# Patient Record
Sex: FEMALE | Race: WHITE | URBAN - METROPOLITAN AREA
[De-identification: names, ages, dates, MRNs, and addresses within clinical notes are randomized per-mention and may not be internally consistent; named-entity substitution may affect disease eponyms.]

---

## 2022-04-30 ENCOUNTER — TELEPHONE ENCOUNTER (OUTPATIENT)
Dept: URBAN - METROPOLITAN AREA CLINIC 121 | Facility: CLINIC | Age: 62
End: 2022-04-30

## 2022-05-01 ENCOUNTER — TELEPHONE ENCOUNTER (OUTPATIENT)
Dept: URBAN - METROPOLITAN AREA CLINIC 121 | Facility: CLINIC | Age: 62
End: 2022-05-01

## 2022-05-01 RX ORDER — SERTRALINE HCL 25 MG
TABLET ORAL
Status: ACTIVE | COMMUNITY
Start: 2018-10-09

## 2022-05-01 RX ORDER — FAMOTIDINE 10 MG
TABLET ORAL
Status: ACTIVE | COMMUNITY
Start: 2015-03-04

## 2023-09-29 ENCOUNTER — LAB OUTSIDE AN ENCOUNTER (OUTPATIENT)
Dept: URBAN - METROPOLITAN AREA CLINIC 27 | Facility: CLINIC | Age: 63
End: 2023-09-29

## 2023-09-29 ENCOUNTER — WEB ENCOUNTER (OUTPATIENT)
Dept: URBAN - METROPOLITAN AREA CLINIC 27 | Facility: CLINIC | Age: 63
End: 2023-09-29

## 2023-09-29 ENCOUNTER — TELEPHONE ENCOUNTER (OUTPATIENT)
Dept: URBAN - METROPOLITAN AREA CLINIC 27 | Facility: CLINIC | Age: 63
End: 2023-09-29

## 2023-09-29 ENCOUNTER — OFFICE VISIT (OUTPATIENT)
Dept: URBAN - METROPOLITAN AREA CLINIC 27 | Facility: CLINIC | Age: 63
End: 2023-09-29
Payer: COMMERCIAL

## 2023-09-29 VITALS
SYSTOLIC BLOOD PRESSURE: 145 MMHG | DIASTOLIC BLOOD PRESSURE: 86 MMHG | WEIGHT: 150 LBS | HEART RATE: 66 BPM | BODY MASS INDEX: 25.61 KG/M2 | HEIGHT: 64 IN

## 2023-09-29 DIAGNOSIS — R94.5 LIVER ENZYMES ABNORMAL: ICD-10-CM

## 2023-09-29 DIAGNOSIS — Z85.3 HISTORY OF BREAST CANCER: ICD-10-CM

## 2023-09-29 DIAGNOSIS — K76.0 FATTY LIVER: ICD-10-CM

## 2023-09-29 DIAGNOSIS — Z80.0 FAMILY HISTORY OF COLON CANCER: ICD-10-CM

## 2023-09-29 DIAGNOSIS — E66.3 OVERWEIGHT: ICD-10-CM

## 2023-09-29 DIAGNOSIS — F41.9 ANXIETY: ICD-10-CM

## 2023-09-29 DIAGNOSIS — Z86.010 HX OF COLONIC POLYPS: ICD-10-CM

## 2023-09-29 PROCEDURE — 99204 OFFICE O/P NEW MOD 45 MIN: CPT | Performed by: INTERNAL MEDICINE

## 2023-09-29 RX ORDER — FAMOTIDINE 10 MG
TABLET ORAL
Status: ON HOLD | COMMUNITY
Start: 2015-03-04

## 2023-09-29 RX ORDER — POLYETHYLENE GLYCOL-3350 AND ELECTROLYTES 236; 6.74; 5.86; 2.97; 22.74 G/274.31G; G/274.31G; G/274.31G; G/274.31G; G/274.31G
4000ML AS DIRECTED FOR COLONOSCOPY POWDER, FOR SOLUTION ORAL ONCE
Qty: 4000 MILLILITER | OUTPATIENT
Start: 2023-09-29 | End: 2023-09-30

## 2023-09-29 RX ORDER — SERTRALINE HCL 25 MG
TABLET ORAL
Status: ACTIVE | COMMUNITY
Start: 2018-10-09

## 2023-09-29 NOTE — HPI-TODAY'S VISIT:
Patient here for repeat colonoscopy.  Her last colonoscopy was in 2018; no polyps.  Polyps have been removed on prior exams, however.  She currently has no GI symptoms.  She is trying to keep her weight in check.  She apparently has a history of "fatty liver" which was apparently diagnosed per CT 4 years ago.  She has not had any recent imaging of her liver.  Recent labs show an AST of 126, .  The rest of her liver enzymes were normal.  Her platelet count was 237K and her hemoglobin was 14.6.  Her sister had colon cancer at age 31.  She does have mild anxiety.  She has a history of breast cancer for which she underwent double mastectomy and XRT 3 years ago.  She states that she remains in remission.  She did not require chemotherapy.

## 2023-09-30 ENCOUNTER — LAB OUTSIDE AN ENCOUNTER (OUTPATIENT)
Dept: URBAN - METROPOLITAN AREA CLINIC 27 | Facility: CLINIC | Age: 63
End: 2023-09-30

## 2023-09-30 ENCOUNTER — DASHBOARD ENCOUNTERS (OUTPATIENT)
Age: 63
End: 2023-09-30

## 2023-10-03 ENCOUNTER — TELEPHONE ENCOUNTER (OUTPATIENT)
Dept: URBAN - METROPOLITAN AREA CLINIC 27 | Facility: CLINIC | Age: 63
End: 2023-10-03

## 2023-10-07 LAB
ALBUMIN/GLOBULIN RATIO: 1.6
ALBUMIN: 4.5
ALBUMIN: 4.6
ALKALINE PHOSPHATASE: 81
ALPHA 2 MACROGLOBULIN: 239
ALPHA-1-ANTITRYPSIN QN: 140
ALPHA-1-GLOBULIN: 0.3
ALPHA-2-GLOBULIN: 0.7
ALT (SGPT): 66
ALT: 66
ANA SCREEN, IFA: NEGATIVE
APOLIPOPROTEIN A1: 154
AST (SGOT): 38
BETA 1 GLOBULIN: 0.5
BETA 2 GLOBULIN: 0.4
BILIRUBIN, DIRECT: 0.1
BILIRUBIN, INDIRECT: 0.4
BILIRUBIN, TOTAL: 0.5
CERULOPLASMIN: 30
FERRITIN, SERUM: 103
FIBROSIS INTERPRETATION: (no result)
FIBROSIS SCORE: 0.39
FIBROSIS STAGE: (no result)
FOOTNOTE: (no result)
GAMMA GLOBULIN: 1.1
GGT: 44
GLOBULIN: 2.8
HAPTOGLOBIN: 83
HEPATITIS A AB, TOTAL: (no result)
HEPATITIS B CORE AB TOTAL: (no result)
HEPATITIS B SURFACE ANTIBODY QL: (no result)
HEPATITIS B SURFACE ANTIGEN: (no result)
HEPATITIS C ANTIBODY: (no result)
IMMUNOGLOBULIN A, QN, SERUM: 285
INTERPRETATION: (no result)
INTERPRETATION: (no result)
IRON BIND.CAP.(TIBC): 442
IRON SATURATION: 24
IRON: 108
MITOCHONDRIAL AB SCREEN: NEGATIVE
NECROINFLAMMAT ACT GRADE: (no result)
NECROINFLAMMAT ACT SCORE: 0.43
NECROINFLAMMAT INTERP: (no result)
PROTEIN, TOTAL: 7.4
PROTEIN, TOTAL: 7.4
REFERENCE ID: (no result)
SMOOTH MUSCLE AB SCREEN: NEGATIVE
T-TRANSGLUTAMINASE (TTG) IGA: <1
TOTAL BILIRUBIN: 0.5

## 2023-11-14 ENCOUNTER — OFFICE VISIT (OUTPATIENT)
Dept: URBAN - METROPOLITAN AREA SURGERY CENTER 7 | Facility: SURGERY CENTER | Age: 63
End: 2023-11-14
Payer: COMMERCIAL

## 2023-11-14 ENCOUNTER — TELEPHONE ENCOUNTER (OUTPATIENT)
Dept: URBAN - METROPOLITAN AREA CLINIC 27 | Facility: CLINIC | Age: 63
End: 2023-11-14

## 2023-11-14 DIAGNOSIS — Z86.010 ADENOMAS PERSONAL HISTORY OF COLONIC POLYPS: ICD-10-CM

## 2023-11-14 DIAGNOSIS — Z09 CARDIOLOGY FOLLOW-UP ENCOUNTER: ICD-10-CM

## 2023-11-14 DIAGNOSIS — K64.0 INTERNAL HEMORRHOIDS GRADE I: ICD-10-CM

## 2023-11-14 DIAGNOSIS — Z80.0 FAMILY HISTORY OF COLON CANCER: ICD-10-CM

## 2023-11-14 DIAGNOSIS — K56.2 TORTUOUS COLON: ICD-10-CM

## 2023-11-14 DIAGNOSIS — Z12.11 COLON CANCER SCREENING (HIGH RISK): ICD-10-CM

## 2023-11-14 PROCEDURE — G8907 PT DOC NO EVENTS ON DISCHARG: HCPCS | Performed by: INTERNAL MEDICINE

## 2023-11-14 PROCEDURE — G0105 COLORECTAL SCRN; HI RISK IND: HCPCS | Performed by: INTERNAL MEDICINE

## 2023-11-14 PROCEDURE — 00811 ANES LWR INTST NDSC NOS: CPT | Performed by: NURSE ANESTHETIST, CERTIFIED REGISTERED

## 2023-11-14 RX ORDER — FAMOTIDINE 10 MG
TABLET ORAL
Status: ON HOLD | COMMUNITY
Start: 2015-03-04

## 2023-11-14 RX ORDER — SERTRALINE HCL 25 MG
TABLET ORAL
Status: ACTIVE | COMMUNITY
Start: 2018-10-09

## 2024-02-21 ENCOUNTER — OV EP (OUTPATIENT)
Dept: URBAN - METROPOLITAN AREA CLINIC 27 | Facility: CLINIC | Age: 64
End: 2024-02-21

## 2024-06-25 ENCOUNTER — WEB ENCOUNTER (OUTPATIENT)
Dept: URBAN - METROPOLITAN AREA CLINIC 27 | Facility: CLINIC | Age: 64
End: 2024-06-25